# Patient Record
Sex: MALE | Race: WHITE | NOT HISPANIC OR LATINO | Employment: FULL TIME | URBAN - METROPOLITAN AREA
[De-identification: names, ages, dates, MRNs, and addresses within clinical notes are randomized per-mention and may not be internally consistent; named-entity substitution may affect disease eponyms.]

---

## 2023-09-08 ENCOUNTER — TELEPHONE (OUTPATIENT)
Age: 33
End: 2023-09-08

## 2023-09-08 ENCOUNTER — APPOINTMENT (EMERGENCY)
Dept: CT IMAGING | Facility: HOSPITAL | Age: 33
End: 2023-09-08
Payer: OTHER MISCELLANEOUS

## 2023-09-08 ENCOUNTER — HOSPITAL ENCOUNTER (EMERGENCY)
Facility: HOSPITAL | Age: 33
Discharge: HOME | End: 2023-09-08
Attending: EMERGENCY MEDICINE
Payer: OTHER MISCELLANEOUS

## 2023-09-08 VITALS
TEMPERATURE: 97.5 F | WEIGHT: 140 LBS | HEART RATE: 62 BPM | DIASTOLIC BLOOD PRESSURE: 88 MMHG | OXYGEN SATURATION: 99 % | SYSTOLIC BLOOD PRESSURE: 140 MMHG | RESPIRATION RATE: 18 BRPM

## 2023-09-08 DIAGNOSIS — K52.9 COLITIS: Primary | ICD-10-CM

## 2023-09-08 DIAGNOSIS — R10.9 NONSPECIFIC ABDOMINAL PAIN: ICD-10-CM

## 2023-09-08 LAB
ALBUMIN SERPL BCP-MCNC: 4.9 G/DL (ref 3.5–5)
ALP SERPL-CCNC: 48 U/L (ref 34–104)
ALT SERPL W P-5'-P-CCNC: 13 U/L (ref 7–52)
ANION GAP SERPL CALCULATED.3IONS-SCNC: 7 MMOL/L
AST SERPL W P-5'-P-CCNC: 14 U/L (ref 13–39)
BASOPHILS # BLD AUTO: 0.02 THOUSANDS/ÂΜL (ref 0–0.1)
BASOPHILS NFR BLD AUTO: 1 % (ref 0–1)
BILIRUB SERPL-MCNC: 1.87 MG/DL (ref 0.2–1)
BUN SERPL-MCNC: 13 MG/DL (ref 5–25)
CALCIUM SERPL-MCNC: 9.6 MG/DL (ref 8.4–10.2)
CHLORIDE SERPL-SCNC: 105 MMOL/L (ref 96–108)
CO2 SERPL-SCNC: 27 MMOL/L (ref 21–32)
CREAT SERPL-MCNC: 0.92 MG/DL (ref 0.6–1.3)
EOSINOPHIL # BLD AUTO: 0.04 THOUSAND/ÂΜL (ref 0–0.61)
EOSINOPHIL NFR BLD AUTO: 1 % (ref 0–6)
ERYTHROCYTE [DISTWIDTH] IN BLOOD BY AUTOMATED COUNT: 12.4 % (ref 11.6–15.1)
GFR SERPL CREATININE-BSD FRML MDRD: 110 ML/MIN/1.73SQ M
GLUCOSE SERPL-MCNC: 83 MG/DL (ref 65–140)
HCT VFR BLD AUTO: 47.1 % (ref 36.5–49.3)
HGB BLD-MCNC: 16.1 G/DL (ref 12–17)
IMM GRANULOCYTES # BLD AUTO: 0.01 THOUSAND/UL (ref 0–0.2)
IMM GRANULOCYTES NFR BLD AUTO: 0 % (ref 0–2)
LIPASE SERPL-CCNC: 8 U/L (ref 11–82)
LYMPHOCYTES # BLD AUTO: 1.38 THOUSANDS/ÂΜL (ref 0.6–4.47)
LYMPHOCYTES NFR BLD AUTO: 32 % (ref 14–44)
MCH RBC QN AUTO: 29.8 PG (ref 26.8–34.3)
MCHC RBC AUTO-ENTMCNC: 34.2 G/DL (ref 31.4–37.4)
MCV RBC AUTO: 87 FL (ref 82–98)
MONOCYTES # BLD AUTO: 0.34 THOUSAND/ÂΜL (ref 0.17–1.22)
MONOCYTES NFR BLD AUTO: 8 % (ref 4–12)
NEUTROPHILS # BLD AUTO: 2.48 THOUSANDS/ÂΜL (ref 1.85–7.62)
NEUTS SEG NFR BLD AUTO: 58 % (ref 43–75)
NRBC BLD AUTO-RTO: 0 /100 WBCS
PLATELET # BLD AUTO: 218 THOUSANDS/UL (ref 149–390)
PMV BLD AUTO: 11.1 FL (ref 8.9–12.7)
POTASSIUM SERPL-SCNC: 4 MMOL/L (ref 3.5–5.3)
PROT SERPL-MCNC: 7.8 G/DL (ref 6.4–8.4)
RBC # BLD AUTO: 5.41 MILLION/UL (ref 3.88–5.62)
SODIUM SERPL-SCNC: 139 MMOL/L (ref 135–147)
WBC # BLD AUTO: 4.27 THOUSAND/UL (ref 4.31–10.16)

## 2023-09-08 PROCEDURE — 96375 TX/PRO/DX INJ NEW DRUG ADDON: CPT

## 2023-09-08 PROCEDURE — 83690 ASSAY OF LIPASE: CPT | Performed by: PHYSICIAN ASSISTANT

## 2023-09-08 PROCEDURE — 36415 COLL VENOUS BLD VENIPUNCTURE: CPT | Performed by: PHYSICIAN ASSISTANT

## 2023-09-08 PROCEDURE — 96374 THER/PROPH/DIAG INJ IV PUSH: CPT

## 2023-09-08 PROCEDURE — 85025 COMPLETE CBC W/AUTO DIFF WBC: CPT | Performed by: PHYSICIAN ASSISTANT

## 2023-09-08 PROCEDURE — 80053 COMPREHEN METABOLIC PANEL: CPT | Performed by: PHYSICIAN ASSISTANT

## 2023-09-08 PROCEDURE — 99285 EMERGENCY DEPT VISIT HI MDM: CPT | Performed by: PHYSICIAN ASSISTANT

## 2023-09-08 PROCEDURE — 74177 CT ABD & PELVIS W/CONTRAST: CPT

## 2023-09-08 PROCEDURE — 99284 EMERGENCY DEPT VISIT MOD MDM: CPT

## 2023-09-08 PROCEDURE — 96361 HYDRATE IV INFUSION ADD-ON: CPT

## 2023-09-08 RX ORDER — KETOROLAC TROMETHAMINE 30 MG/ML
15 INJECTION, SOLUTION INTRAMUSCULAR; INTRAVENOUS ONCE
Status: COMPLETED | OUTPATIENT
Start: 2023-09-08 | End: 2023-09-08

## 2023-09-08 RX ORDER — ONDANSETRON 2 MG/ML
4 INJECTION INTRAMUSCULAR; INTRAVENOUS ONCE
Status: COMPLETED | OUTPATIENT
Start: 2023-09-08 | End: 2023-09-08

## 2023-09-08 RX ORDER — HYDROCORTISONE 25 MG/G
CREAM TOPICAL 2 TIMES DAILY
Qty: 28 G | Refills: 0 | Status: SHIPPED | OUTPATIENT
Start: 2023-09-08

## 2023-09-08 RX ORDER — ONDANSETRON 4 MG/1
4 TABLET, ORALLY DISINTEGRATING ORAL EVERY 8 HOURS PRN
Qty: 15 TABLET | Refills: 0 | Status: SHIPPED | OUTPATIENT
Start: 2023-09-08

## 2023-09-08 RX ORDER — DICYCLOMINE HCL 20 MG
20 TABLET ORAL 2 TIMES DAILY PRN
Qty: 20 TABLET | Refills: 0 | Status: SHIPPED | OUTPATIENT
Start: 2023-09-08 | End: 2023-09-14 | Stop reason: SDUPTHER

## 2023-09-08 RX ADMIN — ONDANSETRON 4 MG: 2 INJECTION INTRAMUSCULAR; INTRAVENOUS at 16:29

## 2023-09-08 RX ADMIN — SODIUM CHLORIDE 1000 ML: 0.9 INJECTION, SOLUTION INTRAVENOUS at 16:31

## 2023-09-08 RX ADMIN — KETOROLAC TROMETHAMINE 15 MG: 30 INJECTION, SOLUTION INTRAMUSCULAR; INTRAVENOUS at 16:29

## 2023-09-08 RX ADMIN — IOHEXOL 100 ML: 350 INJECTION, SOLUTION INTRAVENOUS at 17:53

## 2023-09-08 NOTE — TELEPHONE ENCOUNTER
Patients GI provider:  New Pt    Number to return call: [phone]    Reason for call: Pt does not have insurance. This is Workers Comp Claim, [employer] # [id number], Ph # [phone], Fax # [phone].      Scheduled procedure/appointment date if applicable: Apt 9/01/5690

## 2023-09-08 NOTE — ED NOTES
D/c instructions given to pt by [id number] [facility] Edgardo, SANDIE  09/08/23 7614

## 2023-09-08 NOTE — ED PROVIDER NOTES
History  Chief Complaint   Patient presents with   • Abdominal Pain     Pt presents ambulatory c/o "left sided abdominal pain since last Saturday. +bloody stools, +nausea. Pt had a CT done last Saturday and was told he had the start of intussusception. "     35 yo Male patient here for evaluation of abdominal pain. Started about 1 week ago. Left upper and left lower quadrant. Initially intermittent but getting progressively worse. He went to hospital in [place] where he was located at the time. He had a CAT scan done which showed intussusception. The surgeon evaluated him and ultimately discharged him home with strict return parameters. Over the past 24 hours his pain seems to be getting worse. He has nausea but no vomiting. He is noticing streaks of blood in his stool. Denies any new or unusual foods. Denies recent antibiotics. History provided by:  Patient   used: No    Abdominal Pain  Associated symptoms: no chest pain, no chills, no cough, no dysuria, no fever, no hematuria, no shortness of breath, no sore throat and no vomiting        None       History reviewed. No pertinent past medical history. History reviewed. No pertinent surgical history. History reviewed. No pertinent family history. I have reviewed and agree with the history as documented. E-Cigarette/Vaping     E-Cigarette/Vaping Substances     Social History     Tobacco Use   • Smoking status: Never   • Smokeless tobacco: Never   Substance Use Topics   • Alcohol use: Never   • Drug use: Never       Review of Systems   Constitutional: Negative for chills and fever. HENT: Negative for ear pain and sore throat. Eyes: Negative for pain and visual disturbance. Respiratory: Negative for cough and shortness of breath. Cardiovascular: Negative for chest pain and palpitations. Gastrointestinal: Positive for abdominal pain and blood in stool. Negative for vomiting.    Genitourinary: Negative for dysuria and hematuria. Musculoskeletal: Negative for arthralgias and back pain. Skin: Negative for color change and rash. Neurological: Negative for seizures and syncope. All other systems reviewed and are negative. Physical Exam  Physical Exam  Vitals and nursing note reviewed. Constitutional:       General: He is not in acute distress. Appearance: He is well-developed. HENT:      Head: Normocephalic and atraumatic. Eyes:      Conjunctiva/sclera: Conjunctivae normal.   Cardiovascular:      Rate and Rhythm: Normal rate and regular rhythm. Heart sounds: No murmur heard. Pulmonary:      Effort: Pulmonary effort is normal. No respiratory distress. Breath sounds: Normal breath sounds. Abdominal:      Palpations: Abdomen is soft. Tenderness: There is abdominal tenderness in the left upper quadrant and left lower quadrant. Musculoskeletal:         General: No swelling. Cervical back: Neck supple. Skin:     General: Skin is warm and dry. Capillary Refill: Capillary refill takes less than 2 seconds. Neurological:      Mental Status: He is alert.    Psychiatric:         Mood and Affect: Mood normal.         Vital Signs  ED Triage Vitals [09/08/23 1409]   Temperature Pulse Respirations Blood Pressure SpO2   97.5 °F (36.4 °C) 77 22 140/100 99 %      Temp Source Heart Rate Source Patient Position - Orthostatic VS BP Location FiO2 (%)   Temporal Monitor Sitting Left arm --      Pain Score       --           Vitals:    09/08/23 1409 09/08/23 1631 09/08/23 1800   BP: 140/100 155/88 140/88   Pulse: 77 67 62   Patient Position - Orthostatic VS: Sitting Sitting Sitting         Visual Acuity      ED Medications  Medications   sodium chloride 0.9 % bolus 1,000 mL (0 mL Intravenous Stopped 9/8/23 1857)   ketorolac (TORADOL) injection 15 mg (15 mg Intravenous Given 9/8/23 1629)   ondansetron (ZOFRAN) injection 4 mg (4 mg Intravenous Given 9/8/23 1629)   iohexol (OMNIPAQUE) 350 MG/ML injection (SINGLE-DOSE) 100 mL (100 mL Intravenous Given 9/8/23 1753)       Diagnostic Studies  Results Reviewed     Procedure Component Value Units Date/Time    Comprehensive metabolic panel [[id number]]  (Abnormal) Collected: 09/08/23 1647    Lab Status: Final result Specimen: Blood Updated: 09/08/23 1713     Sodium 139 mmol/L      Potassium 4.0 mmol/L      Chloride 105 mmol/L      CO2 27 mmol/L      ANION GAP 7 mmol/L      BUN 13 mg/dL      Creatinine 0.92 mg/dL      Glucose 83 mg/dL      Calcium 9.6 mg/dL      AST 14 U/L      ALT 13 U/L      Alkaline Phosphatase 48 U/L      Total Protein 7.8 g/dL      Albumin 4.9 g/dL      Total Bilirubin 1.87 mg/dL      eGFR 110 ml/min/1.73sq m     Narrative:      National Kidney Disease Foundation guidelines for Chronic Kidney Disease (CKD):   •  Stage 1 with normal or high GFR (GFR > 90 mL/min/1.73 square meters)  •  Stage 2 Mild CKD (GFR = 60-89 mL/min/1.73 square meters)  •  Stage 3A Moderate CKD (GFR = 45-59 mL/min/1.73 square meters)  •  Stage 3B Moderate CKD (GFR = 30-44 mL/min/1.73 square meters)  •  Stage 4 Severe CKD (GFR = 15-29 mL/min/1.73 square meters)  •  Stage 5 End Stage CKD (GFR <15 mL/min/1.73 square meters)  Note: GFR calculation is accurate only with a steady state creatinine    Lipase [[id number]]  (Abnormal) Collected: 09/08/23 1647    Lab Status: Final result Specimen: Blood Updated: 09/08/23 1713     Lipase 8 u/L     CBC and differential [[id number]]  (Abnormal) Collected: 09/08/23 1704    Lab Status: Final result Specimen: Blood Updated: 09/08/23 1705     WBC 4.27 Thousand/uL      RBC 5.41 Million/uL      Hemoglobin 16.1 g/dL      Hematocrit 47.1 %      MCV 87 fL      MCH 29.8 pg      MCHC 34.2 g/dL      RDW 12.4 %      MPV 11.1 fL      Platelets 607 Thousands/uL      nRBC 0 /100 WBCs      Neutrophils Relative 58 %      Immat GRANS % 0 %      Lymphocytes Relative 32 %      Monocytes Relative 8 %      Eosinophils Relative 1 %      Basophils Relative 1 % Neutrophils Absolute 2.48 Thousands/µL      Immature Grans Absolute 0.01 Thousand/uL      Lymphocytes Absolute 1.38 Thousands/µL      Monocytes Absolute 0.34 Thousand/µL      Eosinophils Absolute 0.04 Thousand/µL      Basophils Absolute 0.02 Thousands/µL     Narrative: This is an appended report. These results have been appended to a previously verified report. CT abdomen pelvis with contrast   Final Result by Kalyn Dawn DO (09/08 1818)      Mild thickening of the sigmoid colon wall which may represent colitis. Follow-up with gastroenterology. Workstation performed: [id number]                    Procedures  Procedures         ED Course                                             Medical Decision Making  DDx including but not limited to: appendicitis, gastroenteritis, gastritis, PUD, GERD, gastroparesis, hepatitis, pancreatitis, colitis, enteritis, diverticulitis, food poisoning, epiploic appendagitis, mesenteric adenitis, mesenteric ischemia, IBD, IBS, bowel obstruction, internal hernia, cholecystitis, choledocholithiasis, splenic etiology, constipation. Plan: ct. Labs. dispo pending. MDM: 34year-old with hematochezia and abdominal pain. CT with evidence of colitis. No other acute process. His laboratory evaluation is unremarkable. This could be a viral syndrome, cannot rule out underlying chronic illness such as ulcerative colitis or Crohn's. I recommend he follow-up with GI. We discussed return parameters. Patient understands and agrees with plan. Amount and/or Complexity of Data Reviewed  Labs: ordered. Radiology: ordered. Risk  Prescription drug management.           Disposition  Final diagnoses:   Colitis   Nonspecific abdominal pain     Time reflects when diagnosis was documented in both MDM as applicable and the Disposition within this note     Time User Action Codes Description Comment    9/8/2023  6:44 PM Libby CAPELLAN Add [K52.9] Colitis 9/8/2023  6:45 PM Bhupinder Alexander Add [R10.9] Nonspecific abdominal pain       ED Disposition     ED Disposition   Discharge    Condition   Stable    Date/Time   Fri Sep 8, 2023  6:44 PM    Comment   Betzy Sung discharge to home/self care. Follow-up Information     Follow up With Specialties Details Why Contact Info Additional [facility] Emergency Department Emergency Medicine Go to  If symptoms worsen [address] [facility] Emergency Department, [place], [place], [address] [place] [address]  [facility]   [address] [facility],[address]  [address] [address]  [address] [facility], [address] [facility],[address], [address] [place], [place], [address] [facility]           Discharge Medication List as of 9/8/2023  6:48 PM      START taking these medications    Details   dicyclomine (BENTYL) 20 mg tablet Take 1 tablet (20 mg total) by mouth 2 (two) times a day as needed (abdominal pain), Starting Fri 9/8/2023, Print      hydrocortisone (ANUSOL-HC) 2.5 % rectal cream Apply topically 2 (two) times a day, Starting Fri 9/8/2023, Print      ondansetron (ZOFRAN-ODT) 4 mg disintegrating tablet Take 1 tablet (4 mg total) by mouth every 8 (eight) hours as needed for nausea, Starting Fri 9/8/2023, Print             No discharge procedures on file.     PDMP Review     None          ED Provider  Electronically Signed by           Bhupinder Alexander PA-C  09/08/23 3196

## 2023-09-13 ENCOUNTER — OFFICE VISIT (OUTPATIENT)
Age: 33
End: 2023-09-13
Payer: OTHER MISCELLANEOUS

## 2023-09-13 ENCOUNTER — TELEPHONE (OUTPATIENT)
Age: 33
End: 2023-09-13

## 2023-09-13 VITALS
OXYGEN SATURATION: 97 % | DIASTOLIC BLOOD PRESSURE: 82 MMHG | WEIGHT: 142 LBS | BODY MASS INDEX: 22.82 KG/M2 | HEIGHT: 66 IN | SYSTOLIC BLOOD PRESSURE: 125 MMHG | HEART RATE: 67 BPM

## 2023-09-13 DIAGNOSIS — R93.3 ABNORMAL DIGESTIVE SYSTEM DIAGNOSTIC IMAGING: ICD-10-CM

## 2023-09-13 DIAGNOSIS — K92.1 HEMATOCHEZIA: Primary | ICD-10-CM

## 2023-09-13 PROCEDURE — 99244 OFF/OP CNSLTJ NEW/EST MOD 40: CPT | Performed by: COLON & RECTAL SURGERY
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
## 2023-09-14 ENCOUNTER — TELEPHONE (OUTPATIENT)
Age: 33
End: 2023-09-14

## 2023-09-14 DIAGNOSIS — R10.9 NONSPECIFIC ABDOMINAL PAIN: ICD-10-CM

## 2023-09-14 DIAGNOSIS — K52.9 COLITIS: ICD-10-CM

## 2023-09-14 RX ORDER — DICYCLOMINE HCL 20 MG
20 TABLET ORAL 2 TIMES DAILY PRN
Qty: 20 TABLET | Refills: 0 | Status: SHIPPED | OUTPATIENT
Start: 2023-09-14

## 2023-09-14 NOTE — TELEPHONE ENCOUNTER
Spoke to ptn he would like to see how everything goes with what Dr. Gray Schneider told him to do and set up a follow up appointment with Dr. Gray Schneider before scheduling the colonoscopy. He has no insurance and runs the risk of possible having to pay for it.  I told him I would talk to Dr. Gray Schneider and call him back on Friday 9/15/23

## 2023-09-14 NOTE — TELEPHONE ENCOUNTER
Penny Parrish from the [facility] called to see waht is needed for patient to have his procedure. Patient mentioned that pre authorization is necessary. Please call Billie Link at [phone]. LENIN raphael.

## 2023-09-14 NOTE — TELEPHONE ENCOUNTER
Spoke to Gisela Yan, she stated they do not pre-approve coverage for services they decide after if they will pay it or not. If they do not pay for it the service will have to go through the ptns personal insurance. Mustapha for ptn letting him know this. Told him we need the personal insurance to have on file incase of non payment and he can decide if he wants to proceed.  Asked him for a call back

## 2023-09-15 NOTE — TELEPHONE ENCOUNTER
After speaking to Dr. Rosenda Marquez, I explained to him the best route to go would be the colonoscopy bc of his symptoms and his medical history and records but ptn did not want to schedule. Dr. Rosenda Marquez did approve a 2 week follow up for ptns ABD pain and will discuss the situation with him further.  ptn understood and scheduled for 9/26/23 at 9:40 am

## 2023-09-26 ENCOUNTER — TELEPHONE (OUTPATIENT)
Dept: GASTROENTEROLOGY | Facility: CLINIC | Age: 33
End: 2023-09-26

## 2023-09-26 ENCOUNTER — OFFICE VISIT (OUTPATIENT)
Age: 33
End: 2023-09-26
Payer: OTHER MISCELLANEOUS

## 2023-09-26 VITALS
HEIGHT: 66 IN | DIASTOLIC BLOOD PRESSURE: 87 MMHG | HEART RATE: 67 BPM | SYSTOLIC BLOOD PRESSURE: 139 MMHG | WEIGHT: 145 LBS | OXYGEN SATURATION: 97 % | BODY MASS INDEX: 23.3 KG/M2

## 2023-09-26 DIAGNOSIS — Z87.19 HISTORY OF ISCHEMIC COLITIS: Primary | ICD-10-CM

## 2023-09-26 PROCEDURE — 99213 OFFICE O/P EST LOW 20 MIN: CPT | Performed by: COLON & RECTAL SURGERY
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
## 2023-09-26 NOTE — TELEPHONE ENCOUNTER
Patients GI provider:  Dr. Bailey Tam    Number to return call: [phone]    Reason for call: Pt requesting OV notes from today's apt w/ Dr. Bailey Tam be faxed to [facility] comp.  Fax# [phone]    Scheduled procedure/appointment date if applicable:

## 2023-09-27 ENCOUNTER — TELEPHONE (OUTPATIENT)
Dept: GASTROENTEROLOGY | Facility: CLINIC | Age: 33
End: 2023-09-27

## 2024-03-21 ENCOUNTER — OFFICE VISIT (OUTPATIENT)
Dept: FAMILY MEDICINE CLINIC | Facility: CLINIC | Age: 34
End: 2024-03-21
Payer: COMMERCIAL

## 2024-03-21 VITALS
OXYGEN SATURATION: 99 % | BODY MASS INDEX: 24.85 KG/M2 | RESPIRATION RATE: 12 BRPM | DIASTOLIC BLOOD PRESSURE: 80 MMHG | SYSTOLIC BLOOD PRESSURE: 110 MMHG | HEART RATE: 60 BPM | HEIGHT: 66 IN | WEIGHT: 154.6 LBS | TEMPERATURE: 97.2 F
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
## 2024-03-21 DIAGNOSIS — F41.9 ANXIETY: ICD-10-CM

## 2024-03-21 DIAGNOSIS — Z13.6 SCREENING FOR CARDIOVASCULAR CONDITION: ICD-10-CM

## 2024-03-21 DIAGNOSIS — R91.1 LUNG NODULE: ICD-10-CM

## 2024-03-21 DIAGNOSIS — Z00.00 ANNUAL PHYSICAL EXAM: ICD-10-CM

## 2024-03-21 DIAGNOSIS — E55.9 VITAMIN D DEFICIENCY: ICD-10-CM
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
## 2024-03-21 PROCEDURE — 99385 PREV VISIT NEW AGE 18-39: CPT

## 2024-03-21 RX ORDER — ESCITALOPRAM OXALATE 5 MG/1
TABLET ORAL
COMMUNITY
Start: 2023-01-01 | End: 2024-03-21 | Stop reason: SDUPTHER

## 2024-03-21 RX ORDER — ESCITALOPRAM OXALATE 5 MG/1
5 TABLET ORAL DAILY
Qty: 90 TABLET | Refills: 3 | Status: SHIPPED | OUTPATIENT
Start: 2024-03-21
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
## 2024-05-13 ENCOUNTER — APPOINTMENT (OUTPATIENT)
Age: 34
End: 2024-05-13
Payer: OTHER MISCELLANEOUS

## 2024-05-13 PROCEDURE — 99213 OFFICE O/P EST LOW 20 MIN: CPT | Performed by: PHYSICIAN ASSISTANT

## 2024-05-14 ENCOUNTER — APPOINTMENT (OUTPATIENT)
Age: 34
End: 2024-05-14
Payer: OTHER MISCELLANEOUS

## 2024-05-14 DIAGNOSIS — A69.20 LYME DISEASE: ICD-10-CM

## 2024-05-14 PROCEDURE — 86618 LYME DISEASE ANTIBODY: CPT

## 2024-05-14 PROCEDURE — 36415 COLL VENOUS BLD VENIPUNCTURE: CPT

## 2024-05-15 LAB — B BURGDOR IGG+IGM SER QL IA: NEGATIVE

## 2024-05-20 ENCOUNTER — TELEPHONE (OUTPATIENT)
Age: 34
End: 2024-05-20

## 2024-05-20 NOTE — TELEPHONE ENCOUNTER
Patient was called today and a message left on his voicemail that his Lyme  antibody study was negative.

## 2025-04-02 DIAGNOSIS — F41.9 ANXIETY: ICD-10-CM

## 2025-04-03 RX ORDER — ESCITALOPRAM OXALATE 5 MG/1
5 TABLET ORAL DAILY
Qty: 90 TABLET | Refills: 0 | Status: SHIPPED | OUTPATIENT
Start: 2025-04-03